# Patient Record
Sex: FEMALE | Race: WHITE | NOT HISPANIC OR LATINO | Employment: PART TIME | URBAN - METROPOLITAN AREA
[De-identification: names, ages, dates, MRNs, and addresses within clinical notes are randomized per-mention and may not be internally consistent; named-entity substitution may affect disease eponyms.]

---

## 2024-07-29 ENCOUNTER — OFFICE VISIT (OUTPATIENT)
Dept: OBSTETRICS AND GYNECOLOGY | Age: 24
End: 2024-07-29
Payer: MEDICAID

## 2024-07-29 VITALS
BODY MASS INDEX: 38.1 KG/M2 | HEIGHT: 64 IN | DIASTOLIC BLOOD PRESSURE: 80 MMHG | WEIGHT: 223.2 LBS | SYSTOLIC BLOOD PRESSURE: 106 MMHG

## 2024-07-29 DIAGNOSIS — Z79.4 TYPE 2 DIABETES MELLITUS WITH HYPERGLYCEMIA, WITH LONG-TERM CURRENT USE OF INSULIN: Chronic | ICD-10-CM

## 2024-07-29 DIAGNOSIS — E11.65 TYPE 2 DIABETES MELLITUS WITH HYPERGLYCEMIA, WITH LONG-TERM CURRENT USE OF INSULIN: Chronic | ICD-10-CM

## 2024-07-29 DIAGNOSIS — E66.01 SEVERE OBESITY (BMI 35.0-39.9) WITH COMORBIDITY: Chronic | ICD-10-CM

## 2024-07-29 DIAGNOSIS — Z3A.08 8 WEEKS GESTATION OF PREGNANCY: Primary | ICD-10-CM

## 2024-07-29 PROBLEM — E78.5 HYPERLIPIDEMIA ASSOCIATED WITH TYPE 2 DIABETES MELLITUS: Chronic | Status: ACTIVE | Noted: 2022-05-05

## 2024-07-29 PROBLEM — E11.69 HYPERLIPIDEMIA ASSOCIATED WITH TYPE 2 DIABETES MELLITUS: Chronic | Status: ACTIVE | Noted: 2022-05-05

## 2024-07-29 PROBLEM — N91.1 SECONDARY AMENORRHEA: Status: ACTIVE | Noted: 2022-05-05

## 2024-07-29 LAB
BILIRUB BLD-MCNC: NEGATIVE MG/DL
CLARITY, POC: CLEAR
COLOR UR: YELLOW
GLUCOSE UR STRIP-MCNC: ABNORMAL MG/DL
KETONES UR QL: ABNORMAL
LEUKOCYTE EST, POC: ABNORMAL
NITRITE UR-MCNC: NEGATIVE MG/ML
PH UR: 5.5 [PH] (ref 5–8)
PROT UR STRIP-MCNC: NEGATIVE MG/DL
RBC # UR STRIP: NEGATIVE /UL
SP GR UR: 1 (ref 1–1.03)
UROBILINOGEN UR QL: ABNORMAL

## 2024-07-29 PROCEDURE — 99204 OFFICE O/P NEW MOD 45 MIN: CPT

## 2024-07-29 RX ORDER — CHOLECALCIFEROL (VITAMIN D3) 25 MCG
1 TABLET,CHEWABLE ORAL DAILY
Qty: 30 CAPSULE | Refills: 11 | Status: SHIPPED | OUTPATIENT
Start: 2024-07-29

## 2024-07-29 NOTE — PROGRESS NOTES
[facility]   Obstetrics and Gynecology   New Gynecology Visit    2024    Patient: Becky Tang          MR#:[id number]    History of Present Illness    Chief Complaint   Patient presents with    Gynecologic Exam     New Gyn, US today,Confirmation of pregnancy patient has no complaints today, last annual per pt 3 years ago, no pap preformed.           23 y.o. female  who presents for Patient plans to see Dr. Yahaira BLANCHARD is with her today  Some Nausea   No vb   She was dx with type II DM and stopped insulin  two years ago  She took ozempic but it was too expensive  She lost wt and worked on diet and was told  her hgb a1c was normal now   So she she stopped all meds  She had a dexcom as well   FOB is blaze   Open to NIPT at a future appt hoping for a boy    Studies reviewed:HEMOGLOBIN A1C (2019 07:16)       Obstetric History:  OB History          2    Para        Term                AB   1    Living             SAB   1    IAB        Ectopic        Molar        Multiple        Live Births                   Menstrual History:     Patient's last menstrual period was 2024.       Sexual History:         Social History:    ________________________________________  Patient Active Problem List   Diagnosis    Type 2 diabetes mellitus with hyperglycemia, with long-term current use of insulin    Severe obesity (BMI 35.0-39.9) with comorbidity    Hyperlipidemia associated with type 2 diabetes mellitus    Secondary amenorrhea     Past Medical History:   Diagnosis Date    Anxiety     Depression     Diabetes mellitus      Past Surgical History:   Procedure Laterality Date    TONSILLECTOMY       Social History     Tobacco Use   Smoking Status Never   Smokeless Tobacco Never     Family History   Problem Relation Age of Onset    Diabetes Father     Heart attack Father     Heart failure Father     Cancer Father     Stroke Mother     Heart failure Mother     Hypertension Mother      "Diabetes Mother      Prior to Admission medications    Not on File     ________________________________________    The following portions of the patient's history were reviewed and updated as appropriate: allergies, current medications, past family history, past medical history, past social history, past surgical history, and problem list.    Review of Systems   Constitutional: Negative.    HENT: Negative.     Eyes: Negative.    Respiratory: Negative.     Cardiovascular: Negative.    Gastrointestinal:  Positive for nausea.   Endocrine: Negative.    Genitourinary: Negative.    Musculoskeletal: Negative.    Skin: Negative.    Allergic/Immunologic: Negative.    Neurological: Negative.    Hematological: Negative.    Psychiatric/Behavioral: Negative.              Objective     /80   Ht 162.6 cm (64\")   Wt 101 kg (223 lb 3.2 oz)   LMP 05/26/2024   BMI 38.31 kg/m²    BP Readings from Last 3 Encounters:   07/29/24 106/80      Wt Readings from Last 3 Encounters:   07/29/24 101 kg (223 lb 3.2 oz)        BMI: Estimated body mass index is 38.31 kg/m² as calculated from the following:    Height as of this encounter: 162.6 cm (64\").    Weight as of this encounter: 101 kg (223 lb 3.2 oz).    Physical Exam  Constitutional:       General: She is not in acute distress.  Pulmonary:      Effort: Pulmonary effort is normal.   Abdominal:      Palpations: Abdomen is soft.      Tenderness: There is no abdominal tenderness.   Skin:     General: Skin is warm.   Neurological:      Mental Status: She is alert.   Psychiatric:         Mood and Affect: Mood normal.         Thought Content: Thought content normal.         Judgment: Judgment normal.           Impression:  Intrauterine pregnancy at 8w1d  Viable first trimester gestation,   Basis for EDC: Ultrasound   Bilateral ovaries appear normal       Assessment:  Diagnoses and all orders for this visit:    1. 8 weeks gestation of pregnancy (Primary)  -     POC Urinalysis " Dipstick  -     Urine Culture - Urine, Urine, Clean Catch  -     Chlamydia trachomatis, Neisseria gonorrhoeae, PCR - Urine, Urine, Clean Catch  -     Drug Profile Urine - 9 Drugs - Urine, Clean Catch  -     ABO / Rh  -     RPR Qualitative with Reflex to Quant  -     Hepatitis B Surface Antigen  -     Rubella Antibody, IgG  -     Hepatitis C Antibody  -     Antibody Screen  -     Varicella Zoster Antibody, IgG  -     CBC (No Diff)  -     Hemoglobin A1c  -     HIV-1 / O / 2 Ag / Antibody  -     Prenatal Vit-Fe Sulfate-FA-DHA (Prenatal Vitamin/Min +DHA) 27-0.8-200 MG capsule; Take 1 tablet by mouth Daily.  Dispense: 30 capsule; Refill: 11    2. Type 2 diabetes mellitus with hyperglycemia, with long-term current use of insulin  -     Ambulatory Referral to Endocrinology    3. Severe obesity (BMI 35.0-39.9) with comorbidity          Plan: Declined pap today discussed in depth about need for good BS control and DM, possible complications in pregnancy reviewed as well I stressed the importance of good BS control and diet/exercise  She is willing to have labs today and possibly see an endocrinologist   Will call with results and adjust management accordingly  PNV sent   Early pregnancy counseling provided and New OB folder given  Problem list reviewed and updated  Reviewed routine prenatal care with the office to include but not limited to expected weight gain during pregnancy, Tylenol products are fine, avoid ibuprofen; not to change cat litter; food restrictions; avoidance of alcohol, tobacco, drugs and saunas/hot tubs. Discussed that the COVID and Flu vaccine is safe and recommended in pregnancy.   SAB warnings reviewed  All questions answered  I spent 45 minutes caring for Becky Tang on this date of service. This time includes time spent by me in the following activities: preparing for the visit, reviewing tests, obtaining and/or reviewing a separately obtained history, performing a medically appropriate examination  and/or evaluation, counseling and educating the patient/family/caregiver, ordering medications, tests, or procedures and documenting information in the medical record.  This time does NOT include time spent on separately reported services.    Return in about 4 weeks (around 8/26/2024).      Liliana Spencer, APRN  7/29/2024 14:20 EDT

## 2024-07-30 ENCOUNTER — PATIENT ROUNDING (BHMG ONLY) (OUTPATIENT)
Dept: OBSTETRICS AND GYNECOLOGY | Age: 24
End: 2024-07-30
Payer: MEDICAID

## 2024-08-02 LAB
ABO GROUP BLD: NORMAL
AMPHETAMINES UR QL SCN: NEGATIVE NG/ML
BACTERIA UR CULT: ABNORMAL
BACTERIA UR CULT: ABNORMAL
BARBITURATES UR QL SCN: NEGATIVE NG/ML
BENZODIAZ UR QL: NEGATIVE NG/ML
BLD GP AB SCN SERPL QL: NEGATIVE
BZE UR QL: NEGATIVE NG/ML
C TRACH RRNA SPEC QL NAA+PROBE: NEGATIVE
CANNABINOIDS UR QL SCN: NEGATIVE NG/ML
ERYTHROCYTE [DISTWIDTH] IN BLOOD BY AUTOMATED COUNT: 12.4 % (ref 11.7–15.4)
HBA1C MFR BLD: 9.5 % (ref 4.8–5.6)
HBV SURFACE AG SERPL QL IA: NEGATIVE
HCT VFR BLD AUTO: 43.5 % (ref 34–46.6)
HCV IGG SERPL QL IA: NON REACTIVE
HGB BLD-MCNC: 14.3 G/DL (ref 11.1–15.9)
HIV 1+2 AB+HIV1 P24 AG SERPL QL IA: NON REACTIVE
MCH RBC QN AUTO: 29.9 PG (ref 26.6–33)
MCHC RBC AUTO-ENTMCNC: 32.9 G/DL (ref 31.5–35.7)
MCV RBC AUTO: 91 FL (ref 79–97)
METHADONE UR QL SCN: NEGATIVE NG/ML
N GONORRHOEA RRNA SPEC QL NAA+PROBE: NEGATIVE
OPIATES UR QL: NEGATIVE NG/ML
OTHER ANTIBIOTIC SUSC ISLT: ABNORMAL
PCP UR QL SCN: NEGATIVE NG/ML
PLATELET # BLD AUTO: 177 X10E3/UL (ref 150–450)
PROPOXYPH UR QL SCN: NEGATIVE NG/ML
RBC # BLD AUTO: 4.79 X10E6/UL (ref 3.77–5.28)
RH BLD: POSITIVE
RPR SER QL: NON REACTIVE
RUBV IGG SERPL IA-ACNC: 3.42 INDEX
VZV IGG SER IA-ACNC: <135 INDEX
WBC # BLD AUTO: 6.3 X10E3/UL (ref 3.4–10.8)

## 2024-08-05 ENCOUNTER — TELEPHONE (OUTPATIENT)
Dept: OBSTETRICS AND GYNECOLOGY | Age: 24
End: 2024-08-05
Payer: MEDICAID

## 2024-08-05 DIAGNOSIS — O23.41 URINARY TRACT INFECTION IN MOTHER DURING FIRST TRIMESTER OF PREGNANCY: Primary | ICD-10-CM

## 2024-08-05 DIAGNOSIS — Z79.4 TYPE 2 DIABETES MELLITUS WITH HYPERGLYCEMIA, WITH LONG-TERM CURRENT USE OF INSULIN: Chronic | ICD-10-CM

## 2024-08-05 DIAGNOSIS — E11.65 TYPE 2 DIABETES MELLITUS WITH HYPERGLYCEMIA, WITH LONG-TERM CURRENT USE OF INSULIN: Chronic | ICD-10-CM

## 2024-08-05 RX ORDER — SULFAMETHOXAZOLE AND TRIMETHOPRIM 800; 160 MG/1; MG/1
1 TABLET ORAL 2 TIMES DAILY
Qty: 10 TABLET | Refills: 0 | Status: SHIPPED | OUTPATIENT
Start: 2024-08-05 | End: 2024-08-10

## 2024-08-05 RX ORDER — BLOOD-GLUCOSE METER
1 KIT MISCELLANEOUS 4 TIMES DAILY
Qty: 1 EACH | Refills: 0 | Status: SHIPPED | OUTPATIENT
Start: 2024-08-05

## 2024-08-05 RX ORDER — LANCETS 28 GAUGE
1 EACH MISCELLANEOUS 4 TIMES DAILY
Qty: 100 EACH | Refills: 2 | Status: SHIPPED | OUTPATIENT
Start: 2024-08-05

## 2024-08-06 ENCOUNTER — TELEPHONE (OUTPATIENT)
Dept: OBSTETRICS AND GYNECOLOGY | Age: 24
End: 2024-08-06
Payer: MEDICAID

## 2024-08-06 ENCOUNTER — TELEPHONE (OUTPATIENT)
Dept: OBSTETRICS AND GYNECOLOGY | Facility: CLINIC | Age: 24
End: 2024-08-06
Payer: MEDICAID

## 2024-08-06 DIAGNOSIS — O24.111 TYPE 2 DIABETES MELLITUS AFFECTING PREGNANCY IN FIRST TRIMESTER, ANTEPARTUM: Primary | ICD-10-CM

## 2024-08-06 NOTE — TELEPHONE ENCOUNTER
Script for Dexcom and insulin called into [facility].    Spoke with pt, explained use of Dexcom,insulin,what to do for low blood sugar,diabetic paperwork emailed.    Pharmacy aware to call if ins does not approve

## 2024-08-06 NOTE — TELEPHONE ENCOUNTER
Caller: Becky Tang    Relationship: Self    Best call back number:    [phone]    Requested Prescriptions:     INSULIN AND DEXCOM       Pharmacy where request should be sent: [facility]  [place] [address] [address] [facility] [phone] [facility] [phone]      Last office visit with prescribing clinician: Visit date not found   Last telemedicine visit with prescribing clinician: Visit date not found   Next office visit with prescribing clinician: 8/14/2024     Additional details provided by patient:     PT STATED PHARM CLOSES AT 6 PM TODAY - URGENT    Does the patient have less than a 3 day supply:  [x] Yes  [] No    Would you like a call back once the refill request has been completed: [x] Yes [] No    If the office needs to give you a call back, can they leave a voicemail: [x] Yes [] No    Rahat Mendez Rep   08/06/24 15:44 EDT

## 2024-08-06 NOTE — TELEPHONE ENCOUNTER
Call placed to Summer to review M diabetes management. Pt reports she is not currently checking her blood sugars. Supplies ordered yesterday. Dexcom ordered by OB office today, 8/6. Pt previously on insulin to help with blood sugar values but stopped a few years ago. Summer has not followed with endocrinology in a few years- will need PP endo referral.     Discussed with pt MFM management and checking blood sugars 7 times a day: before meals, one hour after meals and before bedtime snack. Pt agreeable to this adjustment. Becky notes having the Dexcom sensor will allow her to be most compliant due to her work schedule.     Discussed carb goals and eating every 2-3 hours with 30g carbs for breakfast, 45g carbs for lunch and dinner and 10-15g carbs with snacks in between meals and at bedtime. Pt verbalized understanding.     Glucose goals of 60-90 before meals and  one hour after meals were reviewed. Discussed with patient that if glucose values are consistently outside of the targeted range, MFM will discuss adjusting her medications. Primary OB ordered for Summer to start:  -NPH 36/13  -Humalog 18/0/13    Encouraged pt to start checking blood sugars 7 times a day and bring glucose logs to her upcoming appointment. Becky verbalized understanding.     [id number] message sent to patient with MFM glucose log attached. Will provide patient with MFM folder with diabetes packet, hypoglycemia protocol and extra glucose logs at time of appointment. All questions answered at time of call and encouraged patient to call this RN with any additional questions prior to her appointment. [facility] looks forward to seeing pt on 8/21.
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
## 2024-08-12 ENCOUNTER — TRANSCRIBE ORDERS (OUTPATIENT)
Dept: ULTRASOUND IMAGING | Facility: HOSPITAL | Age: 24
End: 2024-08-12
Payer: COMMERCIAL

## 2024-08-12 ENCOUNTER — TELEPHONE (OUTPATIENT)
Dept: OBSTETRICS AND GYNECOLOGY | Age: 24
End: 2024-08-12
Payer: COMMERCIAL

## 2024-08-12 DIAGNOSIS — E11.65 TYPE 2 DIABETES MELLITUS WITH HYPERGLYCEMIA, WITH LONG-TERM CURRENT USE OF INSULIN: Primary | Chronic | ICD-10-CM

## 2024-08-12 DIAGNOSIS — Z79.4 TYPE 2 DIABETES MELLITUS WITH HYPERGLYCEMIA, WITH LONG-TERM CURRENT USE OF INSULIN: Primary | Chronic | ICD-10-CM

## 2024-08-12 NOTE — TELEPHONE ENCOUNTER
Yes I would prefer she have the pens, but might not be covered by insurance. Can you please call and see if we can change this at the pharmacy. Will need pen needles also    Tiffanie Syed MD  8/12/2024  16:27 EDT

## 2024-08-12 NOTE — TELEPHONE ENCOUNTER
Appt 8/20/24    Pt was asking if she could be prescribed Pins instead of the Vials for her insulin. Please advise.

## 2024-08-13 NOTE — TELEPHONE ENCOUNTER
Pens are covered at pharmacy but pt has picked up vials so will need to use that prior to refilling,pt notified

## 2024-08-16 ENCOUNTER — DOCUMENTATION (OUTPATIENT)
Dept: OBSTETRICS AND GYNECOLOGY | Facility: CLINIC | Age: 24
End: 2024-08-16
Payer: COMMERCIAL

## 2024-08-16 RX ORDER — INSULIN LISPRO 100 [IU]/ML
INJECTION, SOLUTION INTRAVENOUS; SUBCUTANEOUS
Qty: 30 ML | Refills: 8 | Status: SHIPPED | OUTPATIENT
Start: 2024-08-16

## 2024-08-16 NOTE — PROGRESS NOTES
Weekly MFM glucose log evaluation. The following adjustments were made:    Lantus:   Humalo    Call placed to Summer to review insulin adjustment recommendations and to review MFM recommended glucose patterning of 7 times a day: before meals, one hour after meals and before a bedtime snack. Pt acknowledged understanding and will document blood sugar 7 times a day and bring to upcomming MFM appointment on  for review. Updated prescriptions for insulin pens and pen needles sent to patient's listed pharmacy per request.

## 2024-08-19 ENCOUNTER — TELEPHONE (OUTPATIENT)
Dept: OBSTETRICS AND GYNECOLOGY | Facility: CLINIC | Age: 24
End: 2024-08-19
Payer: COMMERCIAL

## 2024-08-19 NOTE — TELEPHONE ENCOUNTER
Call placed to [facility] to follow up on PA received for Summer's Lantus pen. Staff reports Glargine is covered/preferred by insurance. Staff to switch prescription to Glargine instead. Pharmacy will have to order Glargine but will be in tomorrow for Summer to . Qwaq message sent to Summer. Pharmacy will text Summer once prescription is ready for pickup.

## 2024-08-21 ENCOUNTER — TELEPHONE (OUTPATIENT)
Dept: OBSTETRICS AND GYNECOLOGY | Facility: CLINIC | Age: 24
End: 2024-08-21
Payer: COMMERCIAL

## 2024-08-21 ENCOUNTER — TELEPHONE (OUTPATIENT)
Dept: ULTRASOUND IMAGING | Facility: HOSPITAL | Age: 24
End: 2024-08-21
Payer: COMMERCIAL

## 2024-08-21 NOTE — TELEPHONE ENCOUNTER
Call placed to [facility] pharmacy after receiving PA request for Semglee. Verbal order provided to pharmacy to change to Lantus as that was what was ordered. Pharmacy changing prescription to Lantus- should be available for pickup today.

## 2024-08-23 ENCOUNTER — DOCUMENTATION (OUTPATIENT)
Dept: OBSTETRICS AND GYNECOLOGY | Facility: CLINIC | Age: 24
End: 2024-08-23
Payer: COMMERCIAL

## 2024-08-23 NOTE — PROGRESS NOTES
Weekly MFM glucose log evaluation: Multiple attempts made to review blood sugar logs this week with no response.   MFM will plan to review blood glucose logs next week.

## 2024-08-26 ENCOUNTER — TELEPHONE (OUTPATIENT)
Dept: OBSTETRICS AND GYNECOLOGY | Age: 24
End: 2024-08-26

## 2024-08-27 ENCOUNTER — INITIAL PRENATAL (OUTPATIENT)
Dept: OBSTETRICS AND GYNECOLOGY | Age: 24
End: 2024-08-27
Payer: COMMERCIAL

## 2024-08-27 VITALS — DIASTOLIC BLOOD PRESSURE: 82 MMHG | SYSTOLIC BLOOD PRESSURE: 134 MMHG | BODY MASS INDEX: 37.45 KG/M2 | WEIGHT: 218.2 LBS

## 2024-08-27 DIAGNOSIS — O09.41 SUPERVISION OF HIGH-RISK PREGNANCY WITH GRAND MULTIPARITY IN FIRST TRIMESTER: ICD-10-CM

## 2024-08-27 DIAGNOSIS — Z79.4 TYPE 2 DIABETES MELLITUS WITH HYPERGLYCEMIA, WITH LONG-TERM CURRENT USE OF INSULIN: Chronic | ICD-10-CM

## 2024-08-27 DIAGNOSIS — Z13.89 SCREENING FOR BLOOD OR PROTEIN IN URINE: Primary | ICD-10-CM

## 2024-08-27 DIAGNOSIS — O21.9 NAUSEA/VOMITING IN PREGNANCY: ICD-10-CM

## 2024-08-27 DIAGNOSIS — E11.65 TYPE 2 DIABETES MELLITUS WITH HYPERGLYCEMIA, WITH LONG-TERM CURRENT USE OF INSULIN: Chronic | ICD-10-CM

## 2024-08-27 LAB
GLUCOSE UR STRIP-MCNC: NEGATIVE MG/DL
PROT UR STRIP-MCNC: NEGATIVE MG/DL

## 2024-08-27 RX ORDER — PROMETHAZINE HYDROCHLORIDE 12.5 MG/1
12.5 TABLET ORAL EVERY 6 HOURS PRN
Qty: 40 TABLET | Refills: 1 | Status: SHIPPED | OUTPATIENT
Start: 2024-08-27

## 2024-08-27 RX ORDER — GLUCAGON 1 MG
1 KIT INJECTION ONCE AS NEEDED
Qty: 1 EACH | Refills: 0 | Status: SHIPPED | OUTPATIENT
Start: 2024-08-27

## 2024-08-27 RX ORDER — ONDANSETRON 4 MG/1
4 TABLET, FILM COATED ORAL EVERY 6 HOURS PRN
Qty: 40 TABLET | Refills: 1 | Status: SHIPPED | OUTPATIENT
Start: 2024-08-27 | End: 2025-08-27

## 2024-08-27 NOTE — PROGRESS NOTES
Becky Tang, a 23 y.o.  at 12w2d, presents for OB follow-up.  She has been having nausea, vomiting, anorexia. Has lost about 10 lbs this pregnancy. Yesterday was able to eat a banana earlier in the day and then in the evening had one slice of pizza. This is a usual day for her. She has been taking the lantus but is not taking mealtime insulin as she is not eating much. She has the dexcom. Strong famhx of T2DM, dx two years ago.  We discussed missed appts and difficulty getting ahold of her. She states she has been traveling for work and has children she cares for at home. Reviewed that she will have frequent visits due to complex nature of T2DM in pregnancy. She lives in [facility] and  is closer to her, considering transferring to . It would be challening for her to regularly travel to [place] for care (seeing us in [place])  She has occasionally had low glucose on days that she has been unable to keep much down. She is able to eat peanut butter etc and hypoglycemia resolves. Doesn't have glucagon at home. Has used zofran she got from a family member and this has helped with her nausea/vomiting.      Objective  BP Readings from Last 3 Encounters:   24 134/82   24 106/80      Wt Readings from Last 3 Encounters:   24 99 kg (218 lb 3.2 oz)   24 101 kg (223 lb 3.2 oz)      Total weight gain this pregnancy: -2.177 kg (-4 lb 12.8 oz)    General: Awake, alert, no apparent distress  Respiratory: No increased work of breathing  Abdomen: Fundus soft and nontender  Extremity: Nontender, no edema    A/P  Diagnoses and all orders for this visit:    1. Screening for blood or protein in urine (Primary)  -     POC Urinalysis Dipstick    2. Type 2 diabetes mellitus with hyperglycemia, with long-term current use of insulin  -     Hemoglobin A1c  -     Basic Metabolic Panel    3. Supervision of high-risk pregnancy with grand multiparity in first trimester  -     [id number] PLUS  Core+SCA+ESS - Blood,    4. Nausea/vomiting in pregnancy    Other orders  -     ondansetron (Zofran) 4 MG tablet; Take 1 tablet by mouth Every 6 (Six) Hours As Needed for Nausea or Vomiting.  Dispense: 40 tablet; Refill: 1  -     promethazine (PHENERGAN) 12.5 MG tablet; Take 1 tablet by mouth Every 6 (Six) Hours As Needed for Nausea or Vomiting.  Dispense: 40 tablet; Refill: 1  -     Glucagon HCl (Glucagon Emergency) 1 MG/ML reconstituted solution; Inject 1 mL as directed 1 (One) Time As Needed (hypoglycemia) for up to 1 dose.  Dispense: 1 each; Refill: 0      We reviewed the importance of glycemic management and type 2 diabetes with pregnancy.  We reviewed the risk of pregnancy loss, birth defect, macrosomia and difficulties with delivery.  I encouraged compliance with her appointments and care with expectation that she will have very frequent follow-ups.  In reviewing her log, her glucoses actually been trending down but her log is very erratic and would be difficult to make significant insulin adjustments based on her current log.  I gave her glucose logs to complete and reiterated how to test.  I sent in a nausea and vomiting regimen, hopefully with treating her nausea and vomiting her glucose will be less erratic.    Today I recommended going inpatient for 2 or 3 days in order to treat her nausea and vomiting, also to establish a good insulin regimen for her but she declines to do this.    Plan to follow-up with her on Friday by phone and review how she is doing.  She also needs to follow-up on Tuesday.    We will complete other early pregnancy labs, health maintenance needs (EKG, Proteinuria check, ophtho etc) at future appointment but NIPT collected today    Tiffanie Syed MD  8/27/2024  14:25 EDT

## 2024-08-29 ENCOUNTER — TELEPHONE (OUTPATIENT)
Dept: OBSTETRICS AND GYNECOLOGY | Facility: CLINIC | Age: 24
End: 2024-08-29
Payer: COMMERCIAL

## 2024-08-29 NOTE — TELEPHONE ENCOUNTER
Call placed to Summer to discuss care during her pregnancy. Pt saw primary OB on 8/27 where there was a discussion of possible transfer to [place] due to the difficulty of traveling to [place] frequently. No answer at this time. Voicemail left encouraging patient to contact M office back at [phone] to review what would be best for her. [facility] is more than happy to help with diabetes/insulin management if she wants to keep her care here in [place]. This RN will continue to follow up.

## 2024-08-30 ENCOUNTER — TELEPHONE (OUTPATIENT)
Dept: OBSTETRICS AND GYNECOLOGY | Facility: CLINIC | Age: 24
End: 2024-08-30
Payer: COMMERCIAL

## 2024-08-30 ENCOUNTER — DOCUMENTATION (OUTPATIENT)
Dept: OBSTETRICS AND GYNECOLOGY | Facility: CLINIC | Age: 24
End: 2024-08-30
Payer: COMMERCIAL

## 2024-08-30 NOTE — PROGRESS NOTES
Weekly MFM glucose log evaluation. The following adjustments were made:    Lantus:     [id number]    Encouraged pt check the 7x daily according to MFM recommendations.     MFM will review glucose logs again on .

## 2024-08-30 NOTE — TELEPHONE ENCOUNTER
Call placed to Summer to discuss care during the remainder of her pregnancy. Summer is scheduled with OB at  on 9/16 via [facility]. Calling to confirm who Summer will see for MFM services. No answer at this time. Voicemail was left encouraging pt to call MFM at [phone].

## 2024-08-31 LAB
5P15 DELETION (CRI-DU-CHAT): NOT DETECTED
BUN SERPL-MCNC: 9 MG/DL (ref 6–20)
BUN/CREAT SERPL: 14 (ref 9–23)
CALCIUM SERPL-MCNC: 9.4 MG/DL (ref 8.7–10.2)
CFDNA.FET/CFDNA.TOTAL SFR FETUS: NORMAL %
CHLORIDE SERPL-SCNC: 105 MMOL/L (ref 96–106)
CITATION REF LAB TEST: NORMAL
CO2 SERPL-SCNC: 19 MMOL/L (ref 20–29)
CREAT SERPL-MCNC: 0.66 MG/DL (ref 0.57–1)
EGFRCR SERPLBLD CKD-EPI 2021: 126 ML/MIN/1.73
FET 13+18+21+X+Y ANEUP PLAS.CFDNA: NEGATIVE
FET 1P36 DEL RISK WBC.DNA+CFDNA QL: NOT DETECTED
FET 22Q11.2 DEL RISK WBC.DNA+CFDNA QL: NOT DETECTED
FET CHR 11Q23 DEL PLAS.CFDNA QL: NOT DETECTED
FET CHR 15Q11 DEL PLAS.CFDNA QL: NOT DETECTED
FET CHR 21 TS PLAS.CFDNA QL: NEGATIVE
FET CHR 4P16 DEL PLAS.CFDNA QL: NOT DETECTED
FET CHR 8Q24 DEL PLAS.CFDNA QL: NOT DETECTED
FET MS X RISK WBC.DNA+CFDNA QL: NOT DETECTED
FET SEX PLAS.CFDNA DOSAGE CFDNA: NORMAL
FET TS 13 RISK PLAS.CFDNA QL: NEGATIVE
FET TS 18 RISK WBC.DNA+CFDNA QL: NEGATIVE
FET X + Y ANEUP RISK PLAS.CFDNA SEQ-IMP: NOT DETECTED
GA EST FROM CONCEPTION DATE: NORMAL D
GESTATIONAL AGE > 9:: YES
GLUCOSE SERPL-MCNC: 192 MG/DL (ref 70–99)
HBA1C MFR BLD: 7.9 % (ref 4.8–5.6)
LAB DIRECTOR NAME PROVIDER: NORMAL
LAB DIRECTOR NAME PROVIDER: NORMAL
LABORATORY COMMENT REPORT: NORMAL
LIMITATIONS OF THE TEST: NORMAL
NEGATIVE PREDICTIVE VALUE: NORMAL
NOTE: NORMAL
PERFORMANCE CHARACTERISTICS: NORMAL
POSITIVE PREDICTIVE VALUE: NORMAL
POTASSIUM SERPL-SCNC: 4.2 MMOL/L (ref 3.5–5.2)
REF LAB TEST METHOD: NORMAL
SODIUM SERPL-SCNC: 136 MMOL/L (ref 134–144)
TEST PERFORMANCE INFO SPEC: NORMAL
TRIOSOMY 16: NOT DETECTED
TRISOMY 22: NOT DETECTED

## 2024-09-06 ENCOUNTER — DOCUMENTATION (OUTPATIENT)
Dept: OBSTETRICS AND GYNECOLOGY | Facility: CLINIC | Age: 24
End: 2024-09-06
Payer: COMMERCIAL

## 2024-09-06 ENCOUNTER — TELEPHONE (OUTPATIENT)
Dept: OBSTETRICS AND GYNECOLOGY | Facility: CLINIC | Age: 24
End: 2024-09-06
Payer: COMMERCIAL

## 2024-09-06 NOTE — PROGRESS NOTES
Weekly MFM glucose log evaluation. The following adjustments were made:    Lantus:   Humalo/10/18    MFM will review glucose logs again on

## 2024-09-06 NOTE — TELEPHONE ENCOUNTER
Call made to Summer to ensure values on glucose log were 2 hours after meals instead of 1. Pt confirmed she is transferring to an OB at  since it is a closer drive for her. Summer reports she plans to see MFM in [place] as well once she is established. Pt notified her new OB will need to send referral to [facility] in [place] once established so they can take over sugar management. MFM in [place] is happy to continue reviewing blood sugar logs until transferred. Will send any recommendations through [employer] after MFM providers review log.

## 2024-09-13 ENCOUNTER — DOCUMENTATION (OUTPATIENT)
Dept: OBSTETRICS AND GYNECOLOGY | Facility: CLINIC | Age: 24
End: 2024-09-13
Payer: COMMERCIAL

## 2024-09-20 ENCOUNTER — DOCUMENTATION (OUTPATIENT)
Dept: OBSTETRICS AND GYNECOLOGY | Facility: CLINIC | Age: 24
End: 2024-09-20
Payer: COMMERCIAL

## 2024-09-26 ENCOUNTER — DOCUMENTATION (OUTPATIENT)
Dept: OBSTETRICS AND GYNECOLOGY | Facility: CLINIC | Age: 24
End: 2024-09-26
Payer: COMMERCIAL

## 2024-11-25 DIAGNOSIS — Z79.4 TYPE 2 DIABETES MELLITUS WITH HYPERGLYCEMIA, WITH LONG-TERM CURRENT USE OF INSULIN: Chronic | ICD-10-CM

## 2024-11-25 DIAGNOSIS — E11.65 TYPE 2 DIABETES MELLITUS WITH HYPERGLYCEMIA, WITH LONG-TERM CURRENT USE OF INSULIN: Chronic | ICD-10-CM

## 2024-11-25 RX ORDER — BLOOD-GLUCOSE METER
KIT MISCELLANEOUS
Qty: 300 EACH | Refills: 0 | OUTPATIENT
Start: 2024-11-25